# Patient Record
Sex: MALE
[De-identification: names, ages, dates, MRNs, and addresses within clinical notes are randomized per-mention and may not be internally consistent; named-entity substitution may affect disease eponyms.]

---

## 2022-05-26 PROBLEM — Z00.00 ENCOUNTER FOR PREVENTIVE HEALTH EXAMINATION: Status: ACTIVE | Noted: 2022-05-26

## 2022-05-31 ENCOUNTER — NON-APPOINTMENT (OUTPATIENT)
Age: 57
End: 2022-05-31

## 2022-06-07 ENCOUNTER — NON-APPOINTMENT (OUTPATIENT)
Age: 57
End: 2022-06-07

## 2022-06-08 ENCOUNTER — OUTPATIENT (OUTPATIENT)
Dept: OUTPATIENT SERVICES | Facility: HOSPITAL | Age: 57
LOS: 1 days | Discharge: HOME | End: 2022-06-08

## 2022-06-08 DIAGNOSIS — D64.9 ANEMIA, UNSPECIFIED: ICD-10-CM

## 2022-06-08 NOTE — HISTORY OF PRESENT ILLNESS
[Abdominal Pain] : abdominal pain [Jaundice] : jaundice [ERCP] : ERCP [Endostent] : endostent [EUS] : EUS [Biopsy] : biopsy performed [Before Surgery] : before surgery [Nutrition] : Nutrition [Social Work] : Social Work [Ambulatory and capable of all self care but unable to carry out any work activities] : Status 2 - Ambulatory and capable of all self care but unable to carry out any work activities. Up and about more than 50% of waking hours [Not staged outside] : not staged outside [de-identified] : This is a non-billable note*\par \par \par Mr. MAGDA COBURN is a 56 year old male who presents for evaluation in our [facility] for new diagnosis of pancreatic cancer.    His PMH includes hepatitis B, HTN.   PSH of appendectomy (30 yrs ago).   He is primarily Bengali speaking. History taking performed with daughter Karen who translated. \par \par Patient lives in [place] and presented on 22 to local hospital with a few days of abdominal pain. He tested + for covid at that time without any fevers or respiratory symptoms.  CT A/P performed which revealed a pancreas head mass with biliary ductal dilatation.  He was found to be jaundiced with total bilirubin on presentation of 7.4.   He proceeded to have an EUS with biopsy of pancreas mass. Awaiting pathology results report*. He is also s/p ERCP with covered metal stent placement in the CBD on 22.   He developed post-ERCP pancreatitis and currently reports post prandial pain. +Unintentional weight loss of at least 10 lbs.  Prior to recent hospitalization he was in usual state of health and ECOG 0. Does report intermittent abdominal discomfort for a few months prior.  \par \par \par CT A/P IVC 22 showed a moderate intrahepatic biliary ductal dilation of the common duct up to 1.3cm. There is a mass present just superior to the pancreatic head which surrounds the common hepatic artery. The portal vein and intrahepatic portal venous branches are patent. The mass may arise from the pancreatic head and extends superiorly almost is not definitive. \par CT chest 22 noting clear lungs. \par \par Family Ca hx:   Father- liver cancer     Paternal grandfather- liver cancer \par \par ECO  currently.  Walks slow, has fatigue, unable to perform work activities \par \par Labs:\par  AST: 261 ALT: 740 ALK: 133  Tbili: 7.4\par  Ca19-9: 20  CEA:  --     Tbili: 7.7 \par  [TextBox_4] : 5/16/22  [TextBox_23] : 20 [Other: ____] : [unfilled] [] : no [Other: ___] : [unfilled] [Chemotherapy] : chemotherapy [Curative (aimed at resection)] : curative (aimed at resection) [TextBox_5] : 6/7/22 [TextBox_38] : 20 [FreeTextEntry4] : 7.7  [TextBox_40] : 5/16/22  [TextBox_74] : Awaiting pathology report\par Imaging shows ill defined soft tissue encasing hepatic artery and obvious anne disease.\par Unclear if distal cholangio vs PDAC  [FreeTextEntry6] : Obtain pathology report / slides for review\par Neoadjuvant chemo\par Repeat imaging 2 months into therapy / re-review \par

## 2022-06-09 ENCOUNTER — APPOINTMENT (OUTPATIENT)
Dept: MULTI SPECIALTY CLINIC | Facility: CLINIC | Age: 57
End: 2022-06-09